# Patient Record
Sex: MALE
[De-identification: names, ages, dates, MRNs, and addresses within clinical notes are randomized per-mention and may not be internally consistent; named-entity substitution may affect disease eponyms.]

---

## 2020-07-01 ENCOUNTER — APPOINTMENT (OUTPATIENT)
Dept: TRANSGENDER CARE | Facility: CLINIC | Age: 20
End: 2020-07-01

## 2020-07-06 ENCOUNTER — TRANSCRIPTION ENCOUNTER (OUTPATIENT)
Age: 20
End: 2020-07-06

## 2020-07-06 ENCOUNTER — APPOINTMENT (OUTPATIENT)
Dept: TRANSGENDER CARE | Facility: CLINIC | Age: 20
End: 2020-07-06
Payer: COMMERCIAL

## 2020-07-06 DIAGNOSIS — Z83.3 FAMILY HISTORY OF DIABETES MELLITUS: ICD-10-CM

## 2020-07-06 DIAGNOSIS — Z81.8 FAMILY HISTORY OF OTHER MENTAL AND BEHAVIORAL DISORDERS: ICD-10-CM

## 2020-07-06 DIAGNOSIS — Z78.9 OTHER SPECIFIED HEALTH STATUS: ICD-10-CM

## 2020-07-06 DIAGNOSIS — L30.1 DYSHIDROSIS [POMPHOLYX]: ICD-10-CM

## 2020-07-06 DIAGNOSIS — Z87.438 PERSONAL HISTORY OF OTHER DISEASES OF MALE GENITAL ORGANS: ICD-10-CM

## 2020-07-06 PROCEDURE — 99203 OFFICE O/P NEW LOW 30 MIN: CPT | Mod: 95

## 2020-07-06 RX ORDER — TRIAMCINOLONE ACETONIDE 1 MG/G
0.1 CREAM TOPICAL
Refills: 0 | Status: ACTIVE | COMMUNITY

## 2020-07-06 NOTE — PLAN
[FreeTextEntry1] : Transgender care:\par -Patient to establish care with mental health provider to review gender history and will need clearance from them prior to start gender-affirming hormone therapy. \par -Patient will establish care with our center for primary care services.\par -General health labs and baseline endocrine labs ordered in preparation for starting gender-affirming hormone therapy eventually.\par -Eventual name and gender marker changes- will write letter when needed. \par

## 2020-07-06 NOTE — PHYSICAL EXAM
[No Acute Distress] : no acute distress [Well Nourished] : well nourished [Well-Appearing] : well-appearing [EOMI] : extraocular movements intact [No Respiratory Distress] : no respiratory distress  [Normal Outer Ear/Nose] : the outer ears and nose were normal in appearance [Normal Insight/Judgement] : insight and judgment were intact [Normal Affect] : the affect was normal

## 2020-07-06 NOTE — HISTORY OF PRESENT ILLNESS
[Home] : at home, [unfilled] , at the time of the visit. [Medical Office: ([facility])___] : at the medical office located in  [Verbal consent obtained from patient] : the patient, [unfilled] [FreeTextEntry1] : establishing transgender care [de-identified] : Chosen Name: Mya \par • AGAB: Male\par • Gender Identity: Female\par • Pronouns: She/Her\par • Sexual Orientation: Bisexual\par \par • Reason for Appointment: Mya is a 20 year old assigned Male at Birth, ID Female, She/Her pronouns. She is interested in connecting with a therapist and progressively working towards hormone replacement therapy clearance.\par \par • COVID-19 Screening: PT denies a fever, cough, shortness of breath, diarrhea, loss of smell or taste, contact with a person with COVID-19 or travelled to an affected area in the past 14 days. Overall feels well. \par \par • Transition HX + Present Life: As a child, she did not think much of gender but now looking back understands why she had certain behaviors and interests. Describes a masculine school life understanding. Had many male friends. Around puberty, she began to feel her mind did not line up with her body. She began to have doubts about being cisgender the first year of high school. She chose not to come out until college. First told friends and several months ago disclosed to family. At first, parents did not understand “idea” but are now more supportive. She has a good relationship with her sisters. \par \par • School/ Employment: Senior at [employer]. Computer Science Major. Shares, coming out in college was uncomfortable but is happy she did. Pre pandemic, used to work at a library (mostly a summer job). PT is now unemployed. \par \par • Mental Health + Medications: Denies history of anxiety, depression or psychiatric admissions. Used to meet with a therapist but no longer meets with them, been a year. Had to disengage as they are too costly. Denies any sleeping problems. Denies any HX or current SI (attempts or thoughts). Reports she has had“low points “but never SI. \par \par • Endocrinology: No HX of puberty blockers or HRT. \par \par • Feelings of Gender Dysphoria/ Body Dysmorphia: “Biggest issue “is her “ thick body hair “. Reports “slight “unease with genital area. \par \par • Coping Strategies: Enjoys walking and listening to pod casts as ways of self – care. Has supportive friends. \par \par • Appearance | Tucking | Electrolysis + Laser Hair Removal: Her gender presentation is mostly “more male “. She does not own any feminine clothing. Has experimented with makeup. Has never tucked. Used to shave body hair, no longer does. Interested in laser hair removal services. \par \par • Primary care: Goes to an Urgent care if needed. Would like to establish primary care with our center. Only chronic medical issues is eczema. Last bloodwork was a year ago. \par \par • Reproductive Endocrinology: Has to think about it. Not a priority right now. \par \par • Gender Affirming Surgery: Not a priority right now. \par \par • Name Change + Gender Marker: Interested in name and gender marker change. Satisfied with name, Mya ( not short for Gisselle ). \par \par • Nutrition: Denies any nutritional concerns. Reports weight gain since returning from college. Exercise, long walks. \par \par • Sexual Health: Identifies as bisexual. In a monogamous relationship with a trans female. Denies any penetrative sex. \par \par General health concerns: Feels well overall. No recent illness. No COVID contacts. Sleeps well overall. Diet is so-so, worse now that she is living at home. Goes on daily hikes (several miles), but no aerobic exercise.\par \par

## 2020-07-06 NOTE — REVIEW OF SYSTEMS
[Itching] : itching [Skin Rash] : skin rash [Headache] : headache [Depression] : depression [Fever] : no fever [Chills] : no chills [Fatigue] : no fatigue [Discharge] : no discharge [Redness] : no redness [Itching] : no itching [Vision Problems] : no vision problems [Nasal Discharge] : no nasal discharge [Earache] : no earache [Chest Pain] : no chest pain [Palpitations] : no palpitations [Lower Ext Edema] : no lower extremity edema [Shortness Of Breath] : no shortness of breath [Cough] : no cough [Wheezing] : no wheezing [Abdominal Pain] : no abdominal pain [Nausea] : no nausea [Diarrhea] : no diarrhea [Vomiting] : no vomiting [Dysuria] : no dysuria [Frequency] : no frequency [Joint Pain] : no joint pain [Back Pain] : no back pain [Muscle Pain] : no muscle pain [Fainting] : no fainting [Dizziness] : no dizziness [Insomnia] : no insomnia [Suicidal] : not suicidal [de-identified] : +eczema

## 2020-08-05 ENCOUNTER — TRANSCRIPTION ENCOUNTER (OUTPATIENT)
Age: 20
End: 2020-08-05

## 2020-08-18 LAB
25(OH)D3 SERPL-MCNC: 28.5 NG/ML
ALBUMIN SERPL ELPH-MCNC: 5.2 G/DL
ALP BLD-CCNC: 94 U/L
ALT SERPL-CCNC: 42 U/L
ANION GAP SERPL CALC-SCNC: 16 MMOL/L
APPEARANCE: CLEAR
AST SERPL-CCNC: 23 U/L
BASOPHILS # BLD AUTO: 0.05 K/UL
BASOPHILS NFR BLD AUTO: 0.8 %
BILIRUB SERPL-MCNC: 0.6 MG/DL
BILIRUBIN URINE: NEGATIVE
BLOOD URINE: NEGATIVE
BUN SERPL-MCNC: 11 MG/DL
CALCIUM SERPL-MCNC: 10.2 MG/DL
CHLORIDE SERPL-SCNC: 102 MMOL/L
CHOLEST SERPL-MCNC: 193 MG/DL
CHOLEST/HDLC SERPL: 3.4 RATIO
CO2 SERPL-SCNC: 24 MMOL/L
COLOR: NORMAL
CREAT SERPL-MCNC: 0.87 MG/DL
EOSINOPHIL # BLD AUTO: 0.12 K/UL
EOSINOPHIL NFR BLD AUTO: 2 %
ESTIMATED AVERAGE GLUCOSE: 97 MG/DL
ESTRADIOL SERPL-MCNC: 15 PG/ML
FSH SERPL-MCNC: 1.3 IU/L
GLUCOSE QUALITATIVE U: NEGATIVE
GLUCOSE SERPL-MCNC: 98 MG/DL
HBA1C MFR BLD HPLC: 5 %
HCT VFR BLD CALC: 50 %
HDLC SERPL-MCNC: 57 MG/DL
HGB BLD-MCNC: 15.8 G/DL
IMM GRANULOCYTES NFR BLD AUTO: 0.2 %
KETONES URINE: NEGATIVE
LDLC SERPL CALC-MCNC: 109 MG/DL
LEUKOCYTE ESTERASE URINE: NEGATIVE
LH SERPL-ACNC: 4.7 IU/L
LYMPHOCYTES # BLD AUTO: 1.83 K/UL
LYMPHOCYTES NFR BLD AUTO: 30.5 %
MAN DIFF?: NORMAL
MCHC RBC-ENTMCNC: 27.4 PG
MCHC RBC-ENTMCNC: 31.6 GM/DL
MCV RBC AUTO: 86.8 FL
MONOCYTES # BLD AUTO: 0.33 K/UL
MONOCYTES NFR BLD AUTO: 5.5 %
MONOMERIC PROLACTIN (ICMA)*: 2.7 NG/ML
NEUTROPHILS # BLD AUTO: 3.66 K/UL
NEUTROPHILS NFR BLD AUTO: 61 %
NITRITE URINE: NEGATIVE
PERCENT MACROPROLACTIN: 10 %
PH URINE: 7.5
PLATELET # BLD AUTO: 194 K/UL
POTASSIUM SERPL-SCNC: 4.5 MMOL/L
PROLACTIN SERPL-MCNC: 4 NG/ML
PROLACTIN, SERUM (ICMA)*: 3 NG/ML
PROT SERPL-MCNC: 7.5 G/DL
PROTEIN URINE: NEGATIVE
RBC # BLD: 5.76 M/UL
RBC # FLD: 13.2 %
SHBG SERPL-SCNC: 14 NMOL/L
SODIUM SERPL-SCNC: 142 MMOL/L
SPECIFIC GRAVITY URINE: 1.02
TESTOST SERPL-MCNC: 290 NG/DL
TRIGL SERPL-MCNC: 133 MG/DL
TSH SERPL-ACNC: 1.16 UIU/ML
UROBILINOGEN URINE: NORMAL
WBC # FLD AUTO: 6 K/UL

## 2020-08-31 ENCOUNTER — APPOINTMENT (OUTPATIENT)
Dept: TRANSGENDER CARE | Facility: CLINIC | Age: 20
End: 2020-08-31

## 2021-04-23 ENCOUNTER — NON-APPOINTMENT (OUTPATIENT)
Age: 21
End: 2021-04-23

## 2021-06-24 ENCOUNTER — APPOINTMENT (OUTPATIENT)
Dept: TRANSGENDER CARE | Facility: CLINIC | Age: 21
End: 2021-06-24
Payer: COMMERCIAL

## 2021-06-24 PROCEDURE — 99214 OFFICE O/P EST MOD 30 MIN: CPT | Mod: 95

## 2021-06-24 NOTE — PLAN
[FreeTextEntry1] : \par Education:\par Patient wishes to start gender-affirming Feminization therapy. \par Discussed with patient that the feminizing changes of Estrogen and Spironolactone may take several months to become noticeable and usually take 3 to 5 years to reach full effect. \par Discussed permanent changes which include breast growth and development, testicular atrophy, and infertility. \par Discussed non-permanent changes including loss of muscle mass, decreased muscle strength, weight gain w/ redistribution of fat to buttocks and thighs, facial/body hair softening and lightening (current facial and body hair will not go away), male pattern baldness may slow or stop, but not go away, reduced sex drive, decreased strength or erections or cessation of erections, decrease in volume and viscosity of ejaculate, changes in mood including; increased emotional responses, tearfulness, \par Discussed risks and possible side effects, including loss of fertility, increased urinary frequency, possible increased risk of kidney damage, increase risk of blood clots, strokes, and heart attacks, possible increase in cholesterol and blood pressure leading to increased risk for cardiovascular disease, possible increase in risk of developing diabetes, increased appetite and weight gain, possible liver damage, possible worsening of headaches or migraines, galactorrhea and increased prolactin levels, increased risk of prolactinomas, possible worsening of depression or mood swings, and possible increased risk of breast cancer. \par Patient understands that smoking cigarettes may increase some of the risks of taking estrogen, and that taking doses higher than recommended will increase the risks of feminizing treatment. Patient understands that feminizing hormone therapy is expected to be lifelong and that suddenly stopping it after a long time may have negative health effects. Patient understands they may choose to stop therapy at any time and for any reason, but they are encouraged to discuss this decision with a healthcare provider prior to doing this. Patient understands that some medical reasons and/or safety concerns may require decrease in dosage or cessation of therapy at providers discretion. \par Cryopreservation/fertility options were discussed with patient. \par Patient voices understanding of our discussion today. Questions and concerns have been addressed.\par Patient to obtain lab work prior to initiation of HRT.\par Patient to fax over letter of support to office.\par To schedule follow up in person as recommended. \par Repeat levels in 3 months, and titrate dose as appropriate. \par \par

## 2021-06-24 NOTE — HISTORY OF PRESENT ILLNESS
[Home] : at home, [unfilled] , at the time of the visit. [Other Location: e.g. Home (Enter Location, City,State)___] : at [unfilled] [Verbal consent obtained from patient] : the patient, [unfilled] [de-identified] : COURTNEY SANDOVAL (GARETH) is a 21 year old, transfemale seen on 06/24/2021 for  HRT initiation and information.\par  As per patient she feels ready to initiate HRT and states that she has obtained a letter of support and would like to go over options at this time.\par Occupation: Recently graduated with computer science degree from "[employer].  Now actively looking for employment.\par Mental Health: Sees Ayaka Joseph for therapy - sees her y appointment when needed.  States she has been extremely helpful.\par Sexual Health: Currently in long distance/online relationship and states she is content with that at this time.  Reports full monogamy on both sides.\par General Concerns: Denies any specific concerns but reports she still deals with eczema which she has topical creams which help.\par \par Patient denies any known exposure or signs/symptoms of COVID-19 at this time.

## 2021-06-24 NOTE — PHYSICAL EXAM
[No Acute Distress] : no acute distress [Well Nourished] : well nourished [Well Developed] : well developed [Normal Voice/Communication] : normal voice/communication [Normal Sclera/Conjunctiva] : normal sclera/conjunctiva [Normal Outer Ear/Nose] : the outer ears and nose were normal in appearance [Speech Grossly Normal] : speech grossly normal [Memory Grossly Normal] : memory grossly normal [Normal Affect] : the affect was normal [Normal Mood] : the mood was normal [Normal Insight/Judgement] : insight and judgment were intact

## 2022-01-03 ENCOUNTER — APPOINTMENT (OUTPATIENT)
Dept: TRANSGENDER CARE | Facility: CLINIC | Age: 22
End: 2022-01-03

## 2022-01-24 ENCOUNTER — APPOINTMENT (OUTPATIENT)
Dept: TRANSGENDER CARE | Facility: CLINIC | Age: 22
End: 2022-01-24
Payer: COMMERCIAL

## 2022-01-24 PROCEDURE — 99212 OFFICE O/P EST SF 10 MIN: CPT | Mod: 95

## 2022-01-24 NOTE — PHYSICAL EXAM
[No Acute Distress] : no acute distress [Well Nourished] : well nourished [Well Developed] : well developed [Well-Appearing] : well-appearing [Normal Voice/Communication] : normal voice/communication [Normal Sclera/Conjunctiva] : normal sclera/conjunctiva [Normal Outer Ear/Nose] : the outer ears and nose were normal in appearance [No Respiratory Distress] : no respiratory distress  [Speech Grossly Normal] : speech grossly normal [Memory Grossly Normal] : memory grossly normal [Normal Affect] : the affect was normal [Alert and Oriented x3] : oriented to person, place, and time [Normal Mood] : the mood was normal [Normal Insight/Judgement] : insight and judgment were intact

## 2022-01-24 NOTE — HISTORY OF PRESENT ILLNESS
[Home] : at home, [unfilled] , at the time of the visit. 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 No fever, myalgias, throat pain, meningismus.\par Patient denies any known exposure or signs/symptoms of COVID-19 at this time. \par

## 2022-01-24 NOTE — PLAN
[FreeTextEntry1] : TRANS\par - Informed discussion about HRT SEs and effects of initiation have been discussed with patient during previous visit.\par - Patient to schedule in-person appointment for follow up and lab work to rule out underlying endocrinopathy and assess for fitness for hormone therapy.\par -Patient to provide mental health clearance for hormone therapy initiation\par -Patient will review discussed hormonal therapy modalities to determine preferences\par -No interest in surgical interventions at this time. \par \par HCM:\par -To continue with routine dental care as advised by current dental provider\par -Patient to schedule eye exam.\par \par All questions answered and concerns addressed at this time.

## 2022-01-31 ENCOUNTER — NON-APPOINTMENT (OUTPATIENT)
Age: 22
End: 2022-01-31

## 2022-02-14 ENCOUNTER — APPOINTMENT (OUTPATIENT)
Dept: TRANSGENDER CARE | Facility: CLINIC | Age: 22
End: 2022-02-14
Payer: COMMERCIAL

## 2022-02-14 VITALS
HEIGHT: 70 IN | DIASTOLIC BLOOD PRESSURE: 86 MMHG | TEMPERATURE: 98.7 F | HEART RATE: 90 BPM | RESPIRATION RATE: 20 BRPM | OXYGEN SATURATION: 97 % | WEIGHT: 200 LBS | SYSTOLIC BLOOD PRESSURE: 130 MMHG | BODY MASS INDEX: 28.63 KG/M2

## 2022-02-14 DIAGNOSIS — Z13.29 ENCOUNTER FOR SCREENING FOR OTHER SUSPECTED ENDOCRINE DISORDER: ICD-10-CM

## 2022-02-14 DIAGNOSIS — Z13.220 ENCOUNTER FOR SCREENING FOR LIPOID DISORDERS: ICD-10-CM

## 2022-02-14 DIAGNOSIS — Z13.1 ENCOUNTER FOR SCREENING FOR DIABETES MELLITUS: ICD-10-CM

## 2022-02-14 PROCEDURE — 99214 OFFICE O/P EST MOD 30 MIN: CPT | Mod: 25

## 2022-02-14 NOTE — PLAN
[FreeTextEntry1] : HRT\par -Check all general health and endo labs today to rule out underlying endocrinopathy and assess for fitness for hormone therapy.\par -Patient to provide mental health clearance for hormone therapy initiation\par -Discussed HRT modalities and patient wishes to pursue tablets at this time.\par - Informed consent provided to patient\par - Written information provided for patient to review at this visit.\par - Will prescribe regimen once labs have resulted.\par -No interest in surgical interventions at this time. \par

## 2022-02-14 NOTE — HEALTH RISK ASSESSMENT
[Yes] : Yes [Never] : Never [Monthly or less (1 pt)] : Monthly or less (1 point) [No falls in past year] : Patient reported no falls in the past year

## 2022-02-14 NOTE — HISTORY OF PRESENT ILLNESS
[FreeTextEntry1] : HRT information and initiation [de-identified] : COURTNEY SANDOVAL (GARETH) is a 21 year old, transfemale seen on 02/14/2022 for HRT discussion.\par \par As per patient she feels ready to initiate HRT.  \par States that she did have a letter of support completed by [facility] care [facility] submitted for [facility].\par Unsure which modalities are preferred but she is leaning towards tablets.\par \par Not currently seeing any mental health care providers but has good coping mechanisms when needed.\par Denies SI/HI at this time.\par Denies any need for MH services at this time.\par Overall feels well and has no physical complaints.\par \par No signs/symptoms of acute illness. No fever, myalgias, throat pain, meningismus.\par  \par \par

## 2022-02-14 NOTE — PHYSICAL EXAM
[No Acute Distress] : no acute distress [Well Nourished] : well nourished [Well Developed] : well developed [Well-Appearing] : well-appearing [Normal Sclera/Conjunctiva] : normal sclera/conjunctiva [Normal Voice/Communication] : normal voice/communication [PERRL] : pupils equal round and reactive to light [EOMI] : extraocular movements intact [Normal Outer Ear/Nose] : the outer ears and nose were normal in appearance [Normal Oropharynx] : the oropharynx was normal [No JVD] : no jugular venous distention [No Lymphadenopathy] : no lymphadenopathy [Supple] : supple [Thyroid Normal, No Nodules] : the thyroid was normal and there were no nodules present [No Respiratory Distress] : no respiratory distress  [No Accessory Muscle Use] : no accessory muscle use [Clear to Auscultation] : lungs were clear to auscultation bilaterally [Normal Rate] : normal rate  [Regular Rhythm] : with a regular rhythm [Pedal Pulses Present] : the pedal pulses are present [No Edema] : there was no peripheral edema [No Extremity Clubbing/Cyanosis] : no extremity clubbing/cyanosis [Soft] : abdomen soft [Non Tender] : non-tender [Non-distended] : non-distended [No Masses] : no abdominal mass palpated [No HSM] : no HSM [Normal Bowel Sounds] : normal bowel sounds [Normal Supraclavicular Nodes] : no supraclavicular lymphadenopathy [Normal Posterior Cervical Nodes] : no posterior cervical lymphadenopathy [Normal Anterior Cervical Nodes] : no anterior cervical lymphadenopathy [No CVA Tenderness] : no CVA  tenderness [No Spinal Tenderness] : no spinal tenderness [Coordination Grossly Intact] : coordination grossly intact [Speech Grossly Normal] : speech grossly normal [Memory Grossly Normal] : memory grossly normal [Normal Affect] : the affect was normal [Alert and Oriented x3] : oriented to person, place, and time [Normal Mood] : the mood was normal [Normal Insight/Judgement] : insight and judgment were intact

## 2022-02-28 ENCOUNTER — APPOINTMENT (OUTPATIENT)
Dept: TRANSGENDER CARE | Facility: CLINIC | Age: 22
End: 2022-02-28
Payer: COMMERCIAL

## 2022-02-28 LAB
ALBUMIN SERPL ELPH-MCNC: 5.2 G/DL
ALP BLD-CCNC: 108 U/L
ALT SERPL-CCNC: 20 U/L
ANION GAP SERPL CALC-SCNC: 14 MMOL/L
AST SERPL-CCNC: 13 U/L
BASOPHILS # BLD AUTO: 0.06 K/UL
BASOPHILS NFR BLD AUTO: 0.9 %
BILIRUB SERPL-MCNC: 0.4 MG/DL
BUN SERPL-MCNC: 14 MG/DL
CALCIUM SERPL-MCNC: 9.7 MG/DL
CHLORIDE SERPL-SCNC: 106 MMOL/L
CHOLEST SERPL-MCNC: 179 MG/DL
CO2 SERPL-SCNC: 23 MMOL/L
CREAT SERPL-MCNC: 0.88 MG/DL
EOSINOPHIL # BLD AUTO: 0.17 K/UL
EOSINOPHIL NFR BLD AUTO: 2.4 %
ESTIMATED AVERAGE GLUCOSE: 100 MG/DL
ESTRADIOL SERPL-MCNC: 19 PG/ML
FSH SERPL-MCNC: 1.6 IU/L
GLUCOSE SERPL-MCNC: 96 MG/DL
HBA1C MFR BLD HPLC: 5.1 %
HCT VFR BLD CALC: 49.8 %
HDLC SERPL-MCNC: 56 MG/DL
HGB BLD-MCNC: 16 G/DL
IMM GRANULOCYTES NFR BLD AUTO: 0.1 %
LDLC SERPL CALC-MCNC: 108 MG/DL
LH SERPL-ACNC: 3.9 IU/L
LYMPHOCYTES # BLD AUTO: 1.94 K/UL
LYMPHOCYTES NFR BLD AUTO: 27.7 %
MAN DIFF?: NORMAL
MCHC RBC-ENTMCNC: 27.2 PG
MCHC RBC-ENTMCNC: 32.1 GM/DL
MCV RBC AUTO: 84.6 FL
MONOCYTES # BLD AUTO: 0.45 K/UL
MONOCYTES NFR BLD AUTO: 6.4 %
MONOMERIC PROLACTIN (ICMA)*: 2.96 NG/ML
NEUTROPHILS # BLD AUTO: 4.38 K/UL
NEUTROPHILS NFR BLD AUTO: 62.5 %
NONHDLC SERPL-MCNC: 124 MG/DL
PERCENT MACROPROLACTIN: 48 %
PLATELET # BLD AUTO: 219 K/UL
POTASSIUM SERPL-SCNC: 4 MMOL/L
PROLACTIN SERPL-MCNC: 5.6 NG/ML
PROLACTIN, SERUM (ICMA)*: 5.72 NG/ML
PROT SERPL-MCNC: 7.8 G/DL
RBC # BLD: 5.89 M/UL
RBC # FLD: 13.3 %
SHBG SERPL-SCNC: 14.1 NMOL/L
SODIUM SERPL-SCNC: 142 MMOL/L
TESTOST SERPL-MCNC: 225 NG/DL
TRIGL SERPL-MCNC: 78 MG/DL
TSH SERPL-ACNC: 1.07 UIU/ML
WBC # FLD AUTO: 7.01 K/UL

## 2022-02-28 PROCEDURE — 99213 OFFICE O/P EST LOW 20 MIN: CPT | Mod: 95

## 2022-02-28 NOTE — HISTORY OF PRESENT ILLNESS
[Home] : at home, [unfilled] , at the time of the visit. [Medical Office: ([facility])___] : at the medical office located in  [Verbal consent obtained from patient] : the patient, [unfilled] [FreeTextEntry1] : HRT discussion and initiation  [de-identified] : COURTNEY SANDOVAL (GARETH) is a 21 year old, transfemale seen on 02/28/2022 for  HRT initiation.\par Patient states she feels well overall and is excited to begin taking HRT.\par Has decided on tablets to start and would also like to include anti-androgens to regimen.\par Has already been provided with informed consent and has letter of support for initiation.\par No signs/symptoms of acute illness. No fever, myalgias, throat pain, meningismus.\par Patient denies any known exposure or signs/symptoms of COVID-19 at this time. \par

## 2022-02-28 NOTE — PLAN
[FreeTextEntry1] : TRANS\par - Patient to start gender affirming hormone therapy as discussed.\par - Reviewed nonpermanent and permanent changes as well and risks and benefits, as discussed in previous visit.\par Patient voices understanding of our discussion today. Questions and concerns have been addressed. Patient consents to initiation of treatment. \par - Baseline labs reviewed. \par - Patient to start Estradiol 1mg tab BID and Spironolactone 25mg tab BID, with goal estradiol level 100-200 and goal testosterone level <55 \par - Repeat levels in 3 months, and titrate dose as appropriate.\par - Patient will need age appropriate prostate cancer screening and will need age appropriate screening mammogram after 5 years of therapy.\par - All questions answered at this time.\par

## 2022-02-28 NOTE — PHYSICAL EXAM
[No Acute Distress] : no acute distress [Well Nourished] : well nourished [Well Developed] : well developed [Well-Appearing] : well-appearing [Normal Voice/Communication] : normal voice/communication [Normal Sclera/Conjunctiva] : normal sclera/conjunctiva [PERRL] : pupils equal round and reactive to light [Normal Outer Ear/Nose] : the outer ears and nose were normal in appearance [No Respiratory Distress] : no respiratory distress  [Speech Grossly Normal] : speech grossly normal [Memory Grossly Normal] : memory grossly normal [Normal Affect] : the affect was normal [Alert and Oriented x3] : oriented to person, place, and time [Normal Mood] : the mood was normal [Normal Insight/Judgement] : insight and judgment were intact

## 2022-06-27 ENCOUNTER — RX RENEWAL (OUTPATIENT)
Age: 22
End: 2022-06-27

## 2022-09-08 ENCOUNTER — APPOINTMENT (OUTPATIENT)
Dept: TRANSGENDER CARE | Facility: CLINIC | Age: 22
End: 2022-09-08

## 2022-09-26 ENCOUNTER — APPOINTMENT (OUTPATIENT)
Dept: TRANSGENDER CARE | Facility: CLINIC | Age: 22
End: 2022-09-26

## 2022-09-26 VITALS
OXYGEN SATURATION: 98 % | BODY MASS INDEX: 29.78 KG/M2 | DIASTOLIC BLOOD PRESSURE: 82 MMHG | WEIGHT: 208 LBS | HEART RATE: 84 BPM | TEMPERATURE: 98.3 F | SYSTOLIC BLOOD PRESSURE: 124 MMHG | HEIGHT: 70 IN

## 2022-09-26 DIAGNOSIS — Z23 ENCOUNTER FOR IMMUNIZATION: ICD-10-CM

## 2022-09-26 PROCEDURE — G0008: CPT

## 2022-09-26 PROCEDURE — 90686 IIV4 VACC NO PRSV 0.5 ML IM: CPT

## 2022-09-26 PROCEDURE — 99214 OFFICE O/P EST MOD 30 MIN: CPT | Mod: 25

## 2022-09-26 NOTE — HISTORY OF PRESENT ILLNESS
[Home] : at home, [unfilled] , at the time of the visit. [Medical Office: ([facility])___] : at the medical office located in  [Verbal consent obtained from patient] : the patient, [unfilled] [FreeTextEntry1] : f/u [de-identified] : 9/26/22: Patient here today to follow up on GAHT. She was started on regimen in february, has not been back for f/u since. Patient reports starting gaht march 1, has been satisfied with changes so. She reports breast growth, face appearing softer shape, and hips different in shape. Patient reports some nipple tenderness, otherwise tolerating medication. Patient reports happy with current dosing.\par \par Pt denies any leg swelling/pain, erythema, SOB, cough, chest pain, palpitations, N/V/D/C, fever, or chills. No other health concerns today. \par \par \par \par 2/28/22: COURTNEY SANDOVAL is a 21 year old, transfemale seen on 02/28/2022 for  HRT initiation.\par Patient states she feels well overall and is excited to begin taking HRT.\par Has decided on tablets to start and would also like to include anti-androgens to regimen.\par Has already been provided with informed consent and has letter of support for initiation.\par No signs/symptoms of acute illness. No fever, myalgias, throat pain, meningismus.\par Patient denies any known exposure or signs/symptoms of COVID-19 at this time. \par

## 2022-09-26 NOTE — PHYSICAL EXAM
[No Acute Distress] : no acute distress [Well Nourished] : well nourished [Well Developed] : well developed [Well-Appearing] : well-appearing [Normal Voice/Communication] : normal voice/communication [Normal Sclera/Conjunctiva] : normal sclera/conjunctiva [Normal Outer Ear/Nose] : the outer ears and nose were normal in appearance [Pedal Pulses Present] : the pedal pulses are present [No Edema] : there was no peripheral edema [No Extremity Clubbing/Cyanosis] : no extremity clubbing/cyanosis [Normal] : no rash [Coordination Grossly Intact] : coordination grossly intact [No Focal Deficits] : no focal deficits [Normal Gait] : normal gait [Speech Grossly Normal] : speech grossly normal [Memory Grossly Normal] : memory grossly normal [Normal Affect] : the affect was normal [Alert and Oriented x3] : oriented to person, place, and time [Normal Mood] : the mood was normal [Normal Insight/Judgement] : insight and judgment were intact

## 2022-09-26 NOTE — PLAN
[FreeTextEntry1] : \par Gender Dysphoria: Will order routine blood work and f/u results to patient once made available. Continue current medication regimen, happy on dosing - pending labs. F/u 3 months. Advised patient to call with any questions or concerns. Patient verbalized understanding. \par

## 2022-09-27 LAB
ALBUMIN SERPL ELPH-MCNC: 4.9 G/DL
ALP BLD-CCNC: 90 U/L
ALT SERPL-CCNC: 15 U/L
ANION GAP SERPL CALC-SCNC: 12 MMOL/L
AST SERPL-CCNC: 10 U/L
BILIRUB SERPL-MCNC: 0.4 MG/DL
BUN SERPL-MCNC: 14 MG/DL
CALCIUM SERPL-MCNC: 10.2 MG/DL
CHLORIDE SERPL-SCNC: 104 MMOL/L
CO2 SERPL-SCNC: 26 MMOL/L
CREAT SERPL-MCNC: 0.92 MG/DL
EGFR: 120 ML/MIN/1.73M2
ESTRADIOL SERPL-MCNC: 70 PG/ML
GLUCOSE SERPL-MCNC: 90 MG/DL
POTASSIUM SERPL-SCNC: 4.3 MMOL/L
PROT SERPL-MCNC: 7.3 G/DL
SODIUM SERPL-SCNC: 142 MMOL/L
TESTOST SERPL-MCNC: 315 NG/DL

## 2022-09-28 LAB — SHBG SERPL-SCNC: 44.5 NMOL/L

## 2022-12-23 ENCOUNTER — RX RENEWAL (OUTPATIENT)
Age: 22
End: 2022-12-23

## 2023-02-28 ENCOUNTER — NON-APPOINTMENT (OUTPATIENT)
Age: 23
End: 2023-02-28

## 2023-03-15 ENCOUNTER — APPOINTMENT (OUTPATIENT)
Dept: TRANSGENDER CARE | Facility: CLINIC | Age: 23
End: 2023-03-15
Payer: COMMERCIAL

## 2023-03-15 VITALS
WEIGHT: 199 LBS | DIASTOLIC BLOOD PRESSURE: 98 MMHG | SYSTOLIC BLOOD PRESSURE: 138 MMHG | TEMPERATURE: 98.3 F | HEIGHT: 70 IN | BODY MASS INDEX: 28.49 KG/M2 | HEART RATE: 89 BPM | OXYGEN SATURATION: 98 %

## 2023-03-15 VITALS — DIASTOLIC BLOOD PRESSURE: 84 MMHG | SYSTOLIC BLOOD PRESSURE: 132 MMHG

## 2023-03-15 PROCEDURE — 99213 OFFICE O/P EST LOW 20 MIN: CPT | Mod: 25

## 2023-03-15 PROCEDURE — 36415 COLL VENOUS BLD VENIPUNCTURE: CPT

## 2023-03-15 NOTE — PHYSICAL EXAM
[Normal Sclera/Conjunctiva] : normal sclera/conjunctiva [No Edema] : there was no peripheral edema [No Extremity Clubbing/Cyanosis] : no extremity clubbing/cyanosis [Normal Gait] : normal gait [Normal] : affect was normal and insight and judgment were intact

## 2023-03-15 NOTE — PLAN
[FreeTextEntry1] : \par \par Gender Dysphoria: discussed based on last lab results, may have room to increase gaht - pt interested. Will order routine blood work and f/u results to patient once made available - will message on portal once we have results. F/u 3 months. Advised patient to call with any questions or concerns. Patient verbalized understanding.

## 2023-03-15 NOTE — HISTORY OF PRESENT ILLNESS
[FreeTextEntry1] : f/u [de-identified] : Patient here today for gaht f/u. Patient reports people around her have noticed fat redistribution changes. Has not noticed emotional changes. 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 Enjoys it but middle schoolers can be a lot.\par \par Patient denies any headache visual changes, dizziness, chest pain, SOB, palpitations, wheezing, cough, abdominal pain, nausea, vomiting, diarrhea, joint pain, leg swelling or leg pain. No other health concerns today.

## 2023-03-16 ENCOUNTER — TRANSCRIPTION ENCOUNTER (OUTPATIENT)
Age: 23
End: 2023-03-16

## 2023-03-16 LAB
ALBUMIN SERPL ELPH-MCNC: 4.6 G/DL
ALP BLD-CCNC: 78 U/L
ALT SERPL-CCNC: 13 U/L
ANION GAP SERPL CALC-SCNC: 12 MMOL/L
AST SERPL-CCNC: 12 U/L
BASOPHILS # BLD AUTO: 0.06 K/UL
BASOPHILS NFR BLD AUTO: 0.6 %
BILIRUB SERPL-MCNC: 0.6 MG/DL
BUN SERPL-MCNC: 12 MG/DL
CALCIUM SERPL-MCNC: 9.6 MG/DL
CHLORIDE SERPL-SCNC: 104 MMOL/L
CO2 SERPL-SCNC: 24 MMOL/L
CREAT SERPL-MCNC: 0.94 MG/DL
EGFR: 117 ML/MIN/1.73M2
EOSINOPHIL # BLD AUTO: 0.13 K/UL
EOSINOPHIL NFR BLD AUTO: 1.4 %
ESTRADIOL SERPL-MCNC: 62 PG/ML
GLUCOSE SERPL-MCNC: 96 MG/DL
HCT VFR BLD CALC: 46.2 %
HGB BLD-MCNC: 14.9 G/DL
IMM GRANULOCYTES NFR BLD AUTO: 0.3 %
LYMPHOCYTES # BLD AUTO: 1.56 K/UL
LYMPHOCYTES NFR BLD AUTO: 16.8 %
MAN DIFF?: NORMAL
MCHC RBC-ENTMCNC: 28.3 PG
MCHC RBC-ENTMCNC: 32.3 GM/DL
MCV RBC AUTO: 87.7 FL
MONOCYTES # BLD AUTO: 0.45 K/UL
MONOCYTES NFR BLD AUTO: 4.8 %
NEUTROPHILS # BLD AUTO: 7.07 K/UL
NEUTROPHILS NFR BLD AUTO: 76.1 %
PLATELET # BLD AUTO: 203 K/UL
POTASSIUM SERPL-SCNC: 4 MMOL/L
PROT SERPL-MCNC: 7.2 G/DL
RBC # BLD: 5.27 M/UL
RBC # FLD: 12.7 %
SODIUM SERPL-SCNC: 141 MMOL/L
TESTOST SERPL-MCNC: 382 NG/DL
WBC # FLD AUTO: 9.3 K/UL

## 2023-03-29 ENCOUNTER — RX RENEWAL (OUTPATIENT)
Age: 23
End: 2023-03-29

## 2023-04-03 ENCOUNTER — TRANSCRIPTION ENCOUNTER (OUTPATIENT)
Age: 23
End: 2023-04-03

## 2023-04-05 ENCOUNTER — RX RENEWAL (OUTPATIENT)
Age: 23
End: 2023-04-05

## 2023-04-09 ENCOUNTER — RX RENEWAL (OUTPATIENT)
Age: 23
End: 2023-04-09

## 2023-04-26 ENCOUNTER — RX RENEWAL (OUTPATIENT)
Age: 23
End: 2023-04-26

## 2023-07-05 ENCOUNTER — APPOINTMENT (OUTPATIENT)
Dept: TRANSGENDER CARE | Facility: CLINIC | Age: 23
End: 2023-07-05
Payer: COMMERCIAL

## 2023-07-05 ENCOUNTER — LABORATORY RESULT (OUTPATIENT)
Age: 23
End: 2023-07-05

## 2023-07-05 VITALS
SYSTOLIC BLOOD PRESSURE: 136 MMHG | HEIGHT: 70 IN | RESPIRATION RATE: 18 BRPM | BODY MASS INDEX: 27.2 KG/M2 | HEART RATE: 90 BPM | OXYGEN SATURATION: 98 % | TEMPERATURE: 98 F | DIASTOLIC BLOOD PRESSURE: 80 MMHG | WEIGHT: 190 LBS

## 2023-07-05 DIAGNOSIS — Z11.4 ENCOUNTER FOR SCREENING FOR HUMAN IMMUNODEFICIENCY VIRUS [HIV]: ICD-10-CM

## 2023-07-05 DIAGNOSIS — Z11.3 ENCOUNTER FOR SCREENING FOR INFECTIONS WITH A PREDOMINANTLY SEXUAL MODE OF TRANSMISSION: ICD-10-CM

## 2023-07-05 DIAGNOSIS — Z00.00 ENCOUNTER FOR GENERAL ADULT MEDICAL EXAMINATION W/OUT ABNORMAL FINDINGS: ICD-10-CM

## 2023-07-05 PROCEDURE — 36415 COLL VENOUS BLD VENIPUNCTURE: CPT

## 2023-07-05 PROCEDURE — 99214 OFFICE O/P EST MOD 30 MIN: CPT | Mod: 25

## 2023-07-05 NOTE — PLAN
[FreeTextEntry1] : \par \par Gender Dysphoria: stable. Will order routine blood work and f/u results to patient once made available - will send [facility] message with results and dose adjustment if needed. F/u 3 months. Advised patient to call with any questions or concerns. Patient verbalized understanding.

## 2023-07-05 NOTE — HISTORY OF PRESENT ILLNESS
[FreeTextEntry1] : f/u [de-identified] : Patient here today for gaht f/u. Patient reports feeling good on increased gaht regimen, has been on since march of last year. Patient reports that people have noticed changes in face shape and fat redistribution. Patient reports that she reports some emotional changes, more prone to spectrums of happy/sad - more intense than normal.\par \par She reports some itching in scrotum area, denies erythema or rash. She reports this happened for past month, she reports it has lessened over past week. 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 Enjoys it but middle schoolers can be a lot. 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 No other health concerns today.

## 2023-07-05 NOTE — HEALTH RISK ASSESSMENT
[HIV Test offered] : HIV Test offered [Hepatitis C test offered] : Hepatitis C test offered [Sexually Active] : not sexually active [High Risk Behavior] : no high risk behavior [HIVComments] : declines gc/ct swabs

## 2023-07-06 ENCOUNTER — TRANSCRIPTION ENCOUNTER (OUTPATIENT)
Age: 23
End: 2023-07-06

## 2023-07-06 LAB
25(OH)D3 SERPL-MCNC: 23 NG/ML
ALBUMIN SERPL ELPH-MCNC: 4.4 G/DL
ALP BLD-CCNC: 71 U/L
ALT SERPL-CCNC: 11 U/L
ANION GAP SERPL CALC-SCNC: 13 MMOL/L
APPEARANCE: CLEAR
AST SERPL-CCNC: 12 U/L
BACTERIA: NEGATIVE /HPF
BILIRUB SERPL-MCNC: 0.3 MG/DL
BILIRUBIN URINE: NEGATIVE
BLOOD URINE: NEGATIVE
BUN SERPL-MCNC: 10 MG/DL
C TRACH RRNA SPEC QL NAA+PROBE: NOT DETECTED
CALCIUM SERPL-MCNC: 9.5 MG/DL
CAST: 0 /LPF
CHLORIDE SERPL-SCNC: 104 MMOL/L
CHOLEST SERPL-MCNC: 175 MG/DL
CO2 SERPL-SCNC: 24 MMOL/L
COLOR: YELLOW
CREAT SERPL-MCNC: 1.07 MG/DL
EGFR: 99 ML/MIN/1.73M2
EPITHELIAL CELLS: 0 /HPF
ESTIMATED AVERAGE GLUCOSE: 103 MG/DL
ESTRADIOL SERPL-MCNC: 101 PG/ML
GLUCOSE QUALITATIVE U: NEGATIVE MG/DL
GLUCOSE SERPL-MCNC: 107 MG/DL
HBA1C MFR BLD HPLC: 5.2 %
HBV CORE IGG+IGM SER QL: NONREACTIVE
HBV SURFACE AB SERPL IA-ACNC: <3 MIU/ML
HBV SURFACE AG SER QL: NONREACTIVE
HCV AB SER QL: NONREACTIVE
HCV S/CO RATIO: 0.26 S/CO
HDLC SERPL-MCNC: 64 MG/DL
HEPATITIS A IGG ANTIBODY: REACTIVE
HIV1 RNA # SERPL NAA+PROBE: NORMAL
HIV1 RNA # SERPL NAA+PROBE: NORMAL COPIES/ML
HIV1+2 AB SPEC QL IA.RAPID: NONREACTIVE
KETONES URINE: NEGATIVE MG/DL
LDLC SERPL CALC-MCNC: 85 MG/DL
LEUKOCYTE ESTERASE URINE: NEGATIVE
MICROSCOPIC-UA: NORMAL
N GONORRHOEA RRNA SPEC QL NAA+PROBE: NOT DETECTED
NITRITE URINE: NEGATIVE
NONHDLC SERPL-MCNC: 111 MG/DL
PH URINE: 7.5
POTASSIUM SERPL-SCNC: 4.1 MMOL/L
PROT SERPL-MCNC: 6.8 G/DL
PROTEIN URINE: NEGATIVE MG/DL
RED BLOOD CELLS URINE: 0 /HPF
SODIUM SERPL-SCNC: 140 MMOL/L
SOURCE AMPLIFICATION: NORMAL
SPECIFIC GRAVITY URINE: 1.02
T PALLIDUM AB SER QL IA: NEGATIVE
TESTOST SERPL-MCNC: 11.3 NG/DL
TRIGL SERPL-MCNC: 130 MG/DL
TSH SERPL-ACNC: 1.18 UIU/ML
UROBILINOGEN URINE: 0.2 MG/DL
VIRAL LOAD INTERP: NORMAL
VIRAL LOAD LOG: NORMAL LG COP/ML
WHITE BLOOD CELLS URINE: 0 /HPF

## 2023-10-02 ENCOUNTER — RX RENEWAL (OUTPATIENT)
Age: 23
End: 2023-10-02

## 2023-10-10 ENCOUNTER — RX RENEWAL (OUTPATIENT)
Age: 23
End: 2023-10-10

## 2023-11-03 ENCOUNTER — APPOINTMENT (OUTPATIENT)
Dept: TRANSGENDER CARE | Facility: CLINIC | Age: 23
End: 2023-11-03
Payer: COMMERCIAL

## 2023-11-03 VITALS
WEIGHT: 190 LBS | BODY MASS INDEX: 27.2 KG/M2 | OXYGEN SATURATION: 97 % | SYSTOLIC BLOOD PRESSURE: 118 MMHG | DIASTOLIC BLOOD PRESSURE: 76 MMHG | HEIGHT: 70 IN | TEMPERATURE: 97.7 F | HEART RATE: 83 BPM

## 2023-11-03 DIAGNOSIS — F41.9 ANXIETY DISORDER, UNSPECIFIED: ICD-10-CM

## 2023-11-03 DIAGNOSIS — N43.40 SPERMATOCELE OF EPIDIDYMIS, UNSPECIFIED: ICD-10-CM

## 2023-11-03 PROCEDURE — 99214 OFFICE O/P EST MOD 30 MIN: CPT | Mod: 25

## 2023-11-03 PROCEDURE — 36415 COLL VENOUS BLD VENIPUNCTURE: CPT

## 2023-11-03 PROCEDURE — G0008: CPT

## 2023-11-03 PROCEDURE — 90686 IIV4 VACC NO PRSV 0.5 ML IM: CPT

## 2023-11-05 ENCOUNTER — TRANSCRIPTION ENCOUNTER (OUTPATIENT)
Age: 23
End: 2023-11-05

## 2023-11-05 LAB
ALBUMIN SERPL ELPH-MCNC: 4.5 G/DL
ALP BLD-CCNC: 69 U/L
ALT SERPL-CCNC: 18 U/L
ANION GAP SERPL CALC-SCNC: 12 MMOL/L
AST SERPL-CCNC: 17 U/L
BASOPHILS # BLD AUTO: 0.05 K/UL
BASOPHILS NFR BLD AUTO: 0.7 %
BILIRUB SERPL-MCNC: 0.5 MG/DL
BUN SERPL-MCNC: 15 MG/DL
CALCIUM SERPL-MCNC: 9.9 MG/DL
CHLORIDE SERPL-SCNC: 103 MMOL/L
CO2 SERPL-SCNC: 25 MMOL/L
CREAT SERPL-MCNC: 0.83 MG/DL
EGFR: 125 ML/MIN/1.73M2
EOSINOPHIL # BLD AUTO: 0.12 K/UL
EOSINOPHIL NFR BLD AUTO: 1.8 %
ESTRADIOL SERPL-MCNC: 117 PG/ML
GLUCOSE SERPL-MCNC: 115 MG/DL
HCT VFR BLD CALC: 45.2 %
HGB BLD-MCNC: 14.9 G/DL
IMM GRANULOCYTES NFR BLD AUTO: 0.3 %
LYMPHOCYTES # BLD AUTO: 1.68 K/UL
LYMPHOCYTES NFR BLD AUTO: 25.2 %
MAN DIFF?: NORMAL
MCHC RBC-ENTMCNC: 29.3 PG
MCHC RBC-ENTMCNC: 33 GM/DL
MCV RBC AUTO: 89 FL
MONOCYTES # BLD AUTO: 0.33 K/UL
MONOCYTES NFR BLD AUTO: 4.9 %
NEUTROPHILS # BLD AUTO: 4.47 K/UL
NEUTROPHILS NFR BLD AUTO: 67.1 %
PLATELET # BLD AUTO: 223 K/UL
POTASSIUM SERPL-SCNC: 4.5 MMOL/L
PROT SERPL-MCNC: 7.1 G/DL
RBC # BLD: 5.08 M/UL
RBC # FLD: 12.9 %
SODIUM SERPL-SCNC: 140 MMOL/L
TESTOST SERPL-MCNC: 15.3 NG/DL
WBC # FLD AUTO: 6.67 K/UL

## 2024-02-02 ENCOUNTER — APPOINTMENT (OUTPATIENT)
Dept: TRANSGENDER CARE | Facility: CLINIC | Age: 24
End: 2024-02-02
Payer: COMMERCIAL

## 2024-02-02 VITALS
WEIGHT: 189 LBS | OXYGEN SATURATION: 96 % | BODY MASS INDEX: 27.06 KG/M2 | DIASTOLIC BLOOD PRESSURE: 84 MMHG | HEART RATE: 92 BPM | SYSTOLIC BLOOD PRESSURE: 142 MMHG | HEIGHT: 70 IN | TEMPERATURE: 98 F

## 2024-02-02 DIAGNOSIS — F64.9 GENDER IDENTITY DISORDER, UNSPECIFIED: ICD-10-CM

## 2024-02-02 DIAGNOSIS — Z86.39 PERSONAL HISTORY OF OTHER ENDOCRINE, NUTRITIONAL AND METABOLIC DISEASE: ICD-10-CM

## 2024-02-02 PROCEDURE — G2211 COMPLEX E/M VISIT ADD ON: CPT | Mod: NC,1L

## 2024-02-02 PROCEDURE — 99214 OFFICE O/P EST MOD 30 MIN: CPT

## 2024-02-02 PROCEDURE — 36415 COLL VENOUS BLD VENIPUNCTURE: CPT

## 2024-02-02 NOTE — HISTORY OF PRESENT ILLNESS
[FreeTextEntry1] : f/u [de-identified] : Patient here today for gaht f/u. Patient reports feeling good on gaht, no recent changes has been pretty stable. She has been on gaht since march 2022. 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 She reports having a very nice winter break, back to teaching again  Sexual Hx: Relationship status: in relationship Partners (tell me about the genders and bodies of partners, any sperm producing partners): trans fem Practices (types of sex, monogamous/polyamorous): monogamous - oral - verse (partner has no other partners) Protections from STIs (condoms, OCP, LARC, PrEP, etc.): not currently Past Hx of STIs: none Pregnancy (Desire or Hx): n/a LMP: n/a  Patient reports 1 sexual partners in the last 3 months. Last sexual encounter was 1 month ago  Patient denies any headache visual changes, dizziness, chest pain, SOB, palpitations, wheezing, cough, abdominal pain, nausea, vomiting, diarrhea, joint pain, leg swelling or leg pain. No other health concerns today.

## 2024-02-02 NOTE — HEALTH RISK ASSESSMENT
[HIV test declined] : HIV test declined [Hepatitis C test declined] : Hepatitis C test declined [Significant Other] : lives with significant other [Sexually Active] : sexually active [High Risk Behavior] : no high risk behavior [FreeTextEntry3] : long distance, monogamous declines sti testing no new partners since last visit

## 2024-02-02 NOTE — PLAN
[FreeTextEntry1] : Gender Dysphoria: stable. Will order routine blood work and f/u results to patient once made available - will send [facility] message with results and dose adjustment if needed. F/u 4-6 months or sooner if needed. Advised patient to call with any questions or concerns. Patient verbalized understanding.

## 2024-02-04 ENCOUNTER — TRANSCRIPTION ENCOUNTER (OUTPATIENT)
Age: 24
End: 2024-02-04

## 2024-02-04 LAB
25(OH)D3 SERPL-MCNC: 32.9 NG/ML
ALBUMIN SERPL ELPH-MCNC: 4.6 G/DL
ALP BLD-CCNC: 63 U/L
ALT SERPL-CCNC: 16 U/L
ANION GAP SERPL CALC-SCNC: 13 MMOL/L
AST SERPL-CCNC: 13 U/L
BASOPHILS # BLD AUTO: 0.05 K/UL
BASOPHILS NFR BLD AUTO: 0.7 %
BILIRUB SERPL-MCNC: 0.3 MG/DL
BUN SERPL-MCNC: 9 MG/DL
CALCIUM SERPL-MCNC: 9.9 MG/DL
CHLORIDE SERPL-SCNC: 102 MMOL/L
CHOLEST SERPL-MCNC: 169 MG/DL
CO2 SERPL-SCNC: 25 MMOL/L
CREAT SERPL-MCNC: 0.89 MG/DL
EGFR: 123 ML/MIN/1.73M2
EOSINOPHIL # BLD AUTO: 0.1 K/UL
EOSINOPHIL NFR BLD AUTO: 1.4 %
ESTIMATED AVERAGE GLUCOSE: 94 MG/DL
ESTRADIOL SERPL-MCNC: 88 PG/ML
GLUCOSE SERPL-MCNC: 88 MG/DL
HBA1C MFR BLD HPLC: 4.9 %
HCT VFR BLD CALC: 43.9 %
HDLC SERPL-MCNC: 63 MG/DL
HGB BLD-MCNC: 14.5 G/DL
IMM GRANULOCYTES NFR BLD AUTO: 0.1 %
LDLC SERPL CALC-MCNC: 83 MG/DL
LYMPHOCYTES # BLD AUTO: 1.81 K/UL
LYMPHOCYTES NFR BLD AUTO: 25.9 %
MAN DIFF?: NORMAL
MCHC RBC-ENTMCNC: 29.1 PG
MCHC RBC-ENTMCNC: 33 GM/DL
MCV RBC AUTO: 88.2 FL
MONOCYTES # BLD AUTO: 0.36 K/UL
MONOCYTES NFR BLD AUTO: 5.2 %
NEUTROPHILS # BLD AUTO: 4.65 K/UL
NEUTROPHILS NFR BLD AUTO: 66.7 %
NONHDLC SERPL-MCNC: 107 MG/DL
PLATELET # BLD AUTO: 207 K/UL
POTASSIUM SERPL-SCNC: 4.1 MMOL/L
PROT SERPL-MCNC: 7.2 G/DL
RBC # BLD: 4.98 M/UL
RBC # FLD: 13.2 %
SODIUM SERPL-SCNC: 140 MMOL/L
TESTOST SERPL-MCNC: 25.6 NG/DL
TRIGL SERPL-MCNC: 137 MG/DL
TSH SERPL-ACNC: 0.86 UIU/ML
WBC # FLD AUTO: 6.98 K/UL

## 2024-02-21 NOTE — ASSESSMENT
[FreeTextEntry1] : \par Blood work was collected in office at time of visit. \par \par [employer] Safe Sex Evidence Based Intervention was applied and a [positive reinforcement of safe behavior message ] was provided. 
Yes

## 2024-03-31 ENCOUNTER — RX RENEWAL (OUTPATIENT)
Age: 24
End: 2024-03-31

## 2024-05-09 RX ORDER — SPIRONOLACTONE 50 MG/1
50 TABLET ORAL
Qty: 180 | Refills: 0 | Status: ACTIVE | COMMUNITY
Start: 2022-02-28 | End: 1900-01-01

## 2024-05-17 ENCOUNTER — APPOINTMENT (OUTPATIENT)
Dept: TRANSGENDER CARE | Facility: CLINIC | Age: 24
End: 2024-05-17

## 2024-07-01 ENCOUNTER — RX RENEWAL (OUTPATIENT)
Age: 24
End: 2024-07-01

## 2024-08-08 ENCOUNTER — APPOINTMENT (OUTPATIENT)
Dept: TRANSGENDER CARE | Facility: CLINIC | Age: 24
End: 2024-08-08

## 2024-08-08 PROBLEM — K59.00 CONSTIPATION: Status: ACTIVE | Noted: 2024-08-08

## 2024-08-08 PROCEDURE — 99214 OFFICE O/P EST MOD 30 MIN: CPT

## 2024-08-08 PROCEDURE — G2211 COMPLEX E/M VISIT ADD ON: CPT

## 2024-08-08 NOTE — PLAN
[FreeTextEntry1] :  Gender Dysphoria: stable. Will order routine blood work and f/u results to patient once made available - will send [facility] message with results and dose adjustment if needed. F/u 4-6 months or sooner if needed. Advised patient to call with any questions or concerns. Patient verbalized understanding.   Anxiety: stable, patient denies any thoughts of hurting self or anyone else at time of visit. Patient provided with mental health referrals including crisis resources at time of visit.  Advised patient to call with any questions or concerns. Patient verbalized understanding.   Constipation: Counseled patient likely d/t constipation, need to address to alleviate symptoms. Educated patient to keep hydrated, increase daily fiber intake with fruits/veggies and metamucil/fiber supplement if needed. Discussed with patient can use colace/miralax/gas-x for symptoms as well. Patient can use tums/pepcid for an acid reflux accompanying symptoms. If no relief with these measures, will refer to GI for further eval. Advised patient to go to ER with any acute abd pain, fever, blood in stool, n/v etc. Advised patient to call with any questions or concerns. Patient verbalized understanding.

## 2024-08-08 NOTE — HISTORY OF PRESENT ILLNESS
[FreeTextEntry1] : f/u [de-identified] : Patient here today for f/u.   Also notes vague minor lower abdominal pain for the past few months, intermittent and sporadic. Was keeping a food diary but not able to pinpoint anything. Does note history of constipation. She reports abd pain 2-3 months, BM 1 time daily, sometimes every other day. She reports eating and drinking on/off. She reports some days are good, and sometimes slips her mind a bit. She reports BMs can sometimes be looser or harder depending. Patient reports some blood when wiping, not in stool or filling bowl. She reports only happens with constipation. She reports every couple of weeks to couples of months. Not taking anything for symptoms, no fevers   Gender Dysphoria: Patient reports feeling good on gaht, no recent changes has been pretty stable. She has been on gaht since march 2022. She is feeling happy on current regimen, has been on current dose for over a year. 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 She is currently here between classes today. She is excited about going to the [employer], has never been  MH: She reports her mood has been up and down lately, wants to start therapy but waiting on insurance activation within the next few weeks. States she toggles between episodes of high functioning anxiety and lethargic episodes. She denies thoughts of hurting self or anyone else  Sexual Hx: Relationship status: in relationship Partners (tell me about the genders and bodies of partners, any sperm producing partners): trans fem Practices (types of sex, monogamous/polyamorous): monogamous - oral - verse (partner has no other partners) Protections from STIs (condoms, OCP, LARC, PrEP, etc.): not currently Past Hx of STIs: none Pregnancy (Desire or Hx): n/a LMP: n/a  Patient reports 1 sexual partners in the last 3 months. Last sexual encounter was 1 month ago  Patient denies any headache visual changes, dizziness, chest pain, SOB, palpitations, wheezing, cough, abdominal pain, nausea, vomiting, diarrhea, joint pain, leg swelling or leg pain. No other health concerns today.

## 2024-08-08 NOTE — REVIEW OF SYSTEMS
[Negative] : Heme/Lymph [Abdominal Pain] : abdominal pain [Constipation] : constipation [Nausea] : no nausea [Vomiting] : no vomiting

## 2024-08-08 NOTE — PHYSICAL EXAM
[Normal Sclera/Conjunctiva] : normal sclera/conjunctiva [No Edema] : there was no peripheral edema [No Extremity Clubbing/Cyanosis] : no extremity clubbing/cyanosis [Normal Gait] : normal gait [Normal] : affect was normal and insight and judgment were intact [Soft] : abdomen soft [Non Tender] : non-tender [Non-distended] : non-distended [Normal Bowel Sounds] : normal bowel sounds

## 2024-08-16 ENCOUNTER — TRANSCRIPTION ENCOUNTER (OUTPATIENT)
Age: 24
End: 2024-08-16

## 2024-09-13 ENCOUNTER — APPOINTMENT (OUTPATIENT)
Dept: TRANSGENDER CARE | Facility: CLINIC | Age: 24
End: 2024-09-13

## 2024-10-01 ENCOUNTER — RX RENEWAL (OUTPATIENT)
Age: 24
End: 2024-10-01

## 2025-02-07 ENCOUNTER — APPOINTMENT (OUTPATIENT)
Dept: TRANSGENDER CARE | Facility: CLINIC | Age: 25
End: 2025-02-07